# Patient Record
Sex: FEMALE | Race: WHITE | URBAN - METROPOLITAN AREA
[De-identification: names, ages, dates, MRNs, and addresses within clinical notes are randomized per-mention and may not be internally consistent; named-entity substitution may affect disease eponyms.]

---

## 2021-07-15 ENCOUNTER — OFFICE VISIT (OUTPATIENT)
Dept: ENT CLINIC | Age: 36
End: 2021-07-15
Payer: COMMERCIAL

## 2021-07-15 VITALS
TEMPERATURE: 98.1 F | DIASTOLIC BLOOD PRESSURE: 63 MMHG | WEIGHT: 124 LBS | SYSTOLIC BLOOD PRESSURE: 98 MMHG | HEIGHT: 67 IN | BODY MASS INDEX: 19.46 KG/M2 | HEART RATE: 103 BPM

## 2021-07-15 DIAGNOSIS — H69.81 ETD (EUSTACHIAN TUBE DYSFUNCTION), RIGHT: Primary | ICD-10-CM

## 2021-07-15 DIAGNOSIS — H93.8X1 EAR FULLNESS, RIGHT: ICD-10-CM

## 2021-07-15 DIAGNOSIS — H61.21 IMPACTED CERUMEN OF RIGHT EAR: ICD-10-CM

## 2021-07-15 PROCEDURE — 99203 OFFICE O/P NEW LOW 30 MIN: CPT | Performed by: OTOLARYNGOLOGY

## 2021-07-15 PROCEDURE — 31231 NASAL ENDOSCOPY DX: CPT | Performed by: OTOLARYNGOLOGY

## 2021-07-15 RX ORDER — FLUTICASONE PROPIONATE 50 MCG
1 SPRAY, SUSPENSION (ML) NASAL 2 TIMES DAILY
Qty: 1 BOTTLE | Refills: 2 | Status: SHIPPED | OUTPATIENT
Start: 2021-07-15

## 2021-07-15 NOTE — PATIENT INSTRUCTIONS
Ear fullness instructions:  -Start nasal steroids BID  -Auto-insufflate ears (\"pop ears\") 4-5 times daily    Ear hygiene instructions:  -Do not use q-tips or shonna pins to clean out ears  -Do not place fingers in ear canals  -If ears feel occluded by wax, may use hydrogen peroxide (10 drops in each ear every 2 weeks, lie with ear upright for 10-15 minutes after placing hydrogen peroxide drops) or mineral oil (4 drops every 2 weeks) to clean ear canals

## 2021-07-15 NOTE — PROGRESS NOTES
[address] [facility] SURGERY  NEW PATIENT HISTORY AND PHYSICAL NOTE      Patient Name: Anshul Osman  Medical Record Number:  [id number]  Primary Care Physician:  No primary care provider on file. ChiefComplaint     Chief Complaint   Patient presents with    Ear Problem     Complains of fluid build up in right ear. Has been fredy on for 2 years on and off. States that sounds are muffled in right ear and it causes pain on right side of neck. History of Present Illness     Anshul Osman is an 43 y.o. female with recurrent right ear fullness ongoing for the past 2 years. Has been seen at urgent care and by her primary care physician with concern for middle ear effusion; usually is placed on steroids with improvement in symptoms. Concomitant with episodes of ear fullness she has intermittent otalgia and muffled hearing. Denies otorrhea, vertigo, tinnitus. No fevers, chills, night sweats, rapid weight loss, dysphagia/odynophagia. Smokes 0.5 PPD x15 years. Past Medical History     Past Medical History:   Diagnosis Date    Allergic rhinitis     Headache     Tinnitus     When right ear has fluid in it.      TMJ dysfunction        Past Surgical History     Past Surgical History:   Procedure Laterality Date    APPENDECTOMY      BREAST SURGERY      HYSTERECTOMY, TOTAL ABDOMINAL      TONSILLECTOMY         Family History     Family History   Problem Relation Age of Onset    Cancer Father     Diabetes Maternal Aunt     Heart Failure Maternal Uncle     Cancer Maternal Grandfather     Cancer Paternal Grandmother     Heart Failure Paternal Grandmother     Heart Failure Maternal Uncle        Social History     Social History     Tobacco Use    Smoking status: Current Every Day Smoker     Packs/day: 0.50     Years: 15.00     Pack years: 7.50    Smokeless tobacco: Never Used   Vaping Use    Vaping Use: Never used   Substance Use Topics    Alcohol use: Yes     Comment: canal without stenosis, tympanic membrane clear, no middle ear effusions or retractions  FACE: 1/6 House-Brackmann Scale, symmetric, sensation equal bilaterally  ORAL CAVITY: No masses or lesions palpated, uvula is midline, moist mucous membranes, no tonsils, good dentition  NECK: Normal range of motion, no thyromegaly, trachea is midline, no lymphadenopathy, no neck masses, no crepitus  CHEST: Normal respiratory effort, no retractions, breathing comfortably  SKIN: No rashes, normal appearing skin, no evidence of skin lesions/tumors  NEURO: CN 2, 3, 4, 5, 6, 7, 11, 12 intact bilaterally     Data/Imaging Review       Procedure     Nasal Endoscopy    Pre OP: Right ear fullness, rule out nasopharyngeal mass  Post OP: Normal nasal / nasopharyngeal examination     Verbal consent was received. After topical anesthesia and decongestion had been obtained using aerosolized 1% lidocaine and oxymetazoline, a 45 degree rigid endoscope was placed into both nares with the patient in a sitting position.  The following was observed:    Right Nasal Cavity and Paranasal Sinuses:  Polyp score = 0 (0 = no polyps, 1 = small polyps in middle meatus not reaching below the inferior border of the middle radha, 2 = polyps reaching below the middle border of the middle turbinate, 3= large polyps reaching the lower border of the inferior turbinate or polyps medial to the middle radha, 4= large polyps causing almost complete congestion/obstruction of the interior meatus)  Edema score = 1 (0 = absent, 1 = mild, 2 = severe)  Discharge score = 0 (0 = no discharge, 1 = clear thin discharge, 2 = thick purulent discharge)    Left Nasal Cavity and Paranasal Sinuses:    Polyp score = 0 (0 = no polyps, 1 = small polyps in middle meatus not reaching below the inferior border of the middle radha, 2 = polyps reaching below the middle border of the middle turbinate, 3= large polyps reaching the lower border of the inferior turbinate or polyps medial to the middle radha, 4= large polyps causing almost complete congestion/obstruction of the interior meatus)  Edema score = 1 (0 = absent, 1 = mild, 2 = severe)  Discharge score = 0 (0 = no discharge, 1 = clear thin discharge, 2 = thick purulent discharge)    Nasopharynx:     Eustachean tube orifices, Fossae of Rosenmuller and posterior nasopharyngeal wall clear without masses    Septum: intact with no perforations  Other: The inferior and middle turbinates were examined. The middle meatus, and sphenoethmoid recess was examined bilaterally. Cultures not obtained. There were no complications. Tolerated well without complication. I attest that I was present for and did the entire procedure myself. Assessment and Plan      Diagnosis Orders   1. ETD (Eustachian tube dysfunction), right  fluticasone (FLONASE) 50 MCG/ACT nasal spray   2. Impacted cerumen of right ear     3. Ear fullness, right       38-year-old female, with intermittent right ear fullness, hearing loss and otalgia. On examination today she has impacted cerumen of the right ear which was removed under otomicroscopy. This improved ear fullness moderately, but not completely-and as she has a history of tobacco use we perform nasal endoscopy to rule out nasopharyngeal masses. None were identified, and thusly her symptoms may be due to eustachian tube dysfunction. We will start her on Flonase twice daily, have asked her to popper ears-if no improvement in 4-6 weeks she is to call the clinic and schedule an appointment. Return if symptoms worsen or fail to improve. Queen Macrina MD  [facility]  Department of Otolaryngology/Head and Neck Surgery  7/15/21    I have performed a head and neck physical exam personally or was physically present during the key or critical portions of the service. Medical Decision Making:   The following items were considered in medical decision making:  Independent review of images  Review / order clinical lab tests  Review / order radiology tests  Decision to obtain old records  Review and summation of old records as accessed through [facility] (a summary of my findings in these old records: none)     Portions of this note were dictated using Dragon.  There may be linguistic errors secondary to the use of this program.

## 2023-06-21 ENCOUNTER — TELEPHONE (OUTPATIENT)
Dept: URGENT CARE | Age: 38
End: 2023-06-21

## 2023-06-21 NOTE — TELEPHONE ENCOUNTER
Called patient with results of urine culture. Verified 2 patient identifiers. She states that she is feeling well. Will complete ATB as ordered.

## 2023-10-12 ENCOUNTER — OFFICE VISIT (OUTPATIENT)
Dept: URGENT CARE | Age: 38
End: 2023-10-12

## 2023-10-12 VITALS
WEIGHT: 132.2 LBS | HEART RATE: 118 BPM | RESPIRATION RATE: 18 BRPM | TEMPERATURE: 98.5 F | HEIGHT: 66 IN | BODY MASS INDEX: 21.24 KG/M2 | OXYGEN SATURATION: 96 % | DIASTOLIC BLOOD PRESSURE: 85 MMHG | SYSTOLIC BLOOD PRESSURE: 117 MMHG

## 2023-10-12 DIAGNOSIS — R39.15 URGENCY OF URINATION: ICD-10-CM

## 2023-10-12 DIAGNOSIS — N30.00 ACUTE CYSTITIS WITHOUT HEMATURIA: Primary | ICD-10-CM

## 2023-10-12 LAB
APPEARANCE FLUID: CLEAR
BILIRUBIN, POC: NEGATIVE
BLOOD URINE, POC: ABNORMAL
CLARITY, POC: ABNORMAL
COLOR, POC: YELLOW
GLUCOSE URINE, POC: NEGATIVE
KETONES, POC: NEGATIVE
LEUKOCYTE EST, POC: NEGATIVE
NITRITE, POC: NEGATIVE
PH, POC: 6.5
PROTEIN, POC: NEGATIVE
SPECIFIC GRAVITY, POC: 1.01
UROBILINOGEN, POC: ABNORMAL

## 2023-10-12 RX ORDER — NITROFURANTOIN 25; 75 MG/1; MG/1
100 CAPSULE ORAL 2 TIMES DAILY
Qty: 10 CAPSULE | Refills: 0 | Status: SHIPPED | OUTPATIENT
Start: 2023-10-12 | End: 2023-10-17

## 2023-10-12 ASSESSMENT — ENCOUNTER SYMPTOMS
ABDOMINAL DISTENTION: 0
RESPIRATORY NEGATIVE: 1
VOMITING: 0
RECTAL PAIN: 0
ABDOMINAL PAIN: 0
BLOOD IN STOOL: 0
CONSTIPATION: 0
NAUSEA: 1
ANAL BLEEDING: 0
DIARRHEA: 1

## 2023-10-14 LAB
BACTERIA UR CULT: ABNORMAL
BACTERIA UR CULT: ABNORMAL
ORGANISM: ABNORMAL

## 2024-01-15 ENCOUNTER — OFFICE VISIT (OUTPATIENT)
Dept: URGENT CARE | Age: 39
End: 2024-01-15

## 2024-01-15 VITALS
HEART RATE: 107 BPM | TEMPERATURE: 97.7 F | BODY MASS INDEX: 20.98 KG/M2 | SYSTOLIC BLOOD PRESSURE: 112 MMHG | WEIGHT: 130 LBS | DIASTOLIC BLOOD PRESSURE: 77 MMHG | RESPIRATION RATE: 22 BRPM | OXYGEN SATURATION: 98 %

## 2024-01-15 DIAGNOSIS — R10.30 LOWER ABDOMINAL PAIN: ICD-10-CM

## 2024-01-15 DIAGNOSIS — N30.00 ACUTE CYSTITIS WITHOUT HEMATURIA: Primary | ICD-10-CM

## 2024-01-15 DIAGNOSIS — Z87.440 HISTORY OF UTI: ICD-10-CM

## 2024-01-15 PROBLEM — R10.84 CHRONIC GENERALIZED ABDOMINAL PAIN: Status: ACTIVE | Noted: 2023-06-12

## 2024-01-15 PROBLEM — G89.29 CHRONIC GENERALIZED ABDOMINAL PAIN: Status: ACTIVE | Noted: 2023-06-12

## 2024-01-15 LAB
BILIRUBIN, POC: NEGATIVE
BLOOD URINE, POC: ABNORMAL
CLARITY, POC: CLEAR
COLOR, POC: ABNORMAL
GLUCOSE URINE, POC: NEGATIVE
KETONES, POC: NEGATIVE
LEUKOCYTE EST, POC: ABNORMAL
NITRITE, POC: NEGATIVE
PH, POC: 6
PROTEIN, POC: NEGATIVE
SPECIFIC GRAVITY, POC: 1.03
UROBILINOGEN, POC: ABNORMAL

## 2024-01-15 RX ORDER — NITROFURANTOIN 25; 75 MG/1; MG/1
100 CAPSULE ORAL 2 TIMES DAILY
Qty: 10 CAPSULE | Refills: 0 | Status: SHIPPED | OUTPATIENT
Start: 2024-01-15 | End: 2024-01-20

## 2024-01-15 NOTE — PROGRESS NOTES
Juana Joshi (: 1985) is a 38 y.o. female, Established patient, here for evaluation of the following chief complaint(s):  Urinary Tract Infection (Pt c/o lower abd pain and dysuria off and on for approx 2 weeks. )      ASSESSMENT/PLAN:    ICD-10-CM    1. Acute cystitis without hematuria  N30.00 nitrofurantoin, macrocrystal-monohydrate, (MACROBID) 100 MG capsule      2. History of UTI  Z87.440 POCT Urinalysis no Micro     Culture, Urine      3. Lower abdominal pain  R10.30 POCT Urinalysis no Micro     Culture, Urine          UA showing leukocytes and blood, and with symptoms, consistent for UTI  Exam without concerns for complicated UTI (no CVA tenderness, fevers, nausea, vomiting, or abdominal pain).  Low concern for sepsis, pyelonephritis, nephrolith, colitis, diverticulitis, appendicitis, and vulvo-vaginitis.  Urine culture sent to confirm, to identify pathogen, and to clarify sensitivities.   Will augment treatment plan as necessary pending culture results.  Macrobid is prescribed for antibiotic treatment  Increase water intake during treatment.  ibuprofen (Motrin or Advil) or Acetaminophen (Tylenol) for pain symptoms.  Strict ED follow up instructions provided.    Follow up with your PCP or return to the clinic in 5 days if symptoms persist or if symptoms worsen.  The patient tolerated their visit well. The patient and/or the family were informed of the results of any tests, a time was given to answer questions, a plan was proposed and they agreed with plan. Reviewed AVS with treatment instructions and answered questions - pt/family expresses understanding and agreement with the discussed treatment plan and AVS instructions.    SUBJECTIVE/OBJECTIVE:  HPI:   38 y.o. female presents for complaint of possible UTI x 2 weeks.  Pt notes having history of multiple past UTIs, and states she has been attempting to self treat with increased water intake and OTC Azo, but notes that has been having persistent

## 2024-01-15 NOTE — PATIENT INSTRUCTIONS
Urine culture sent to confirm, to identify pathogen, and to clarify sensitivities.   Will augment treatment plan as necessary pending culture results.  Macrobid is prescribed for antibiotic treatment  Take the antibiotics until completed, do not stop unless otherwise directed by a healthcare provider.  Recommend daily yogurt or other probiotics while on antibiotics.  Increase water intake during treatment.  Can take ibuprofen (Motrin or Advil) or Acetaminophen (Tylenol) for pain symptoms.  If fevers, shivering, nausea, vomiting, shortness of breath, chest pain, if severe abdominal or back pain develops, or if symptoms continue to worsen despite treatment plan, follow up with the ER for further evaluation.  Follow up with your PCP or return to the clinic in 5 days if symptoms persist or if symptoms worsen.    New Prescriptions    NITROFURANTOIN, MACROCRYSTAL-MONOHYDRATE, (MACROBID) 100 MG CAPSULE    Take 1 capsule by mouth 2 times daily for 5 days

## 2024-01-17 LAB
BACTERIA UR CULT: ABNORMAL
BACTERIA UR CULT: ABNORMAL
ORGANISM: ABNORMAL

## 2024-01-19 ENCOUNTER — TELEPHONE (OUTPATIENT)
Age: 39
End: 2024-01-19

## 2024-01-19 NOTE — TELEPHONE ENCOUNTER
----- Message from Roberta Smith PA-C sent at 1/19/2024  8:16 AM EST -----  Urine culture shows positive for E. Coli and pt is to continue taking antibiotic prescribed.    Pt to call if any symptoms worsen or follow up with PCP

## 2024-01-21 ENCOUNTER — TELEPHONE (OUTPATIENT)
Dept: URGENT CARE | Age: 39
End: 2024-01-21

## 2024-01-22 ENCOUNTER — OFFICE VISIT (OUTPATIENT)
Dept: URGENT CARE | Age: 39
End: 2024-01-22

## 2024-01-22 VITALS
SYSTOLIC BLOOD PRESSURE: 115 MMHG | BODY MASS INDEX: 20.98 KG/M2 | WEIGHT: 130 LBS | RESPIRATION RATE: 18 BRPM | HEART RATE: 111 BPM | DIASTOLIC BLOOD PRESSURE: 80 MMHG | OXYGEN SATURATION: 98 % | TEMPERATURE: 98.4 F

## 2024-01-22 DIAGNOSIS — R30.0 DYSURIA: Primary | ICD-10-CM

## 2024-01-22 LAB
BILIRUBIN, POC: NEGATIVE
BLOOD URINE, POC: ABNORMAL
CLARITY, POC: CLEAR
COLOR, POC: YELLOW
GLUCOSE URINE, POC: NEGATIVE
KETONES, POC: NEGATIVE
LEUKOCYTE EST, POC: ABNORMAL
NITRITE, POC: NEGATIVE
PH, POC: 5.5
PROTEIN, POC: NEGATIVE
SPECIFIC GRAVITY, POC: 1.01
UROBILINOGEN, POC: ABNORMAL

## 2024-01-22 RX ORDER — SULFAMETHOXAZOLE AND TRIMETHOPRIM 800; 160 MG/1; MG/1
1 TABLET ORAL 2 TIMES DAILY
Qty: 20 TABLET | Refills: 0 | Status: SHIPPED | OUTPATIENT
Start: 2024-01-22 | End: 2024-02-01

## 2024-01-22 NOTE — PROGRESS NOTES
Juana Joshi (:  1985) is a 38 y.o. female,Established patient, here for evaluation of the following chief complaint(s):  Urinary Tract Infection (Pt c/o UTI symptoms improving slightly but not gone completely. )      ASSESSMENT/PLAN:    ICD-10-CM    1. Dysuria  R30.0 POCT Urinalysis no Micro     Culture, Urine     sulfamethoxazole-trimethoprim (BACTRIM DS;SEPTRA DS) 800-160 MG per tablet        Patient's last visit with urologist was this past summer  She been also seen and evaluated by her gynecologist  Has had multiple diagnostic tests which have not confirmed any specific diagnosis  She has been told that because of multiple gyn surgeries that she may have adhesions causing her symptoms....says she seems to get a flair up of urinary symptoms every 3-4 months        Keep hydrated, tylenol or ibuprofen (if no contraindications) as needed if pain or fever..  follow up in  7- days if not better  Return sooner or go to the ER if symptoms worse/feeling worse or has new symptoms or concerns    Advised to discuss recent urgent care visits, antibiotics with her urologist          SUBJECTIVE/OBJECTIVE:  Patient presents with:  Urinary Tract Infection: Pt c/o UTI symptoms improving slightly but not gone completely.   C/o  return of urinary frequency/urgency  Last treated 01/15/2024 with 5 days of macrobid. ..  Symptoms eased up but never went away  (recent culture/sensitivities reviewed )         History provided by:  Patient   used: No    Urinary Tract Infection  Associated symptoms include hematuria.       Vitals:    24 1636   BP: 115/80   Pulse: (!) 111   Resp: 18   Temp: 98.4 °F (36.9 °C)   SpO2: 98%   Weight: 59 kg (130 lb)       Review of Systems   Constitutional:  Negative for fever.   Genitourinary:  Positive for frequency, hematuria and urgency. Negative for flank pain, pelvic pain, vaginal bleeding, vaginal discharge and vaginal pain.       Physical Exam    Physical  Vitals

## 2024-01-22 NOTE — PATIENT INSTRUCTIONS
Keep hydrated, tylenol or ibuprofen (if no contraindications) as needed if pain or fever..  follow up in  7- days if not better  Return sooner or go to the ER if symptoms worse/feeling worse or has new symptoms or concerns    Advised to discuss recent urgent care visits, antibiotics with her urologist

## 2024-01-25 LAB
BACTERIA UR CULT: ABNORMAL
BACTERIA UR CULT: ABNORMAL
ORGANISM: ABNORMAL